# Patient Record
Sex: MALE | Race: BLACK OR AFRICAN AMERICAN | NOT HISPANIC OR LATINO | Employment: FULL TIME | URBAN - METROPOLITAN AREA
[De-identification: names, ages, dates, MRNs, and addresses within clinical notes are randomized per-mention and may not be internally consistent; named-entity substitution may affect disease eponyms.]

---

## 2023-04-04 ENCOUNTER — HOSPITAL ENCOUNTER (EMERGENCY)
Facility: HOSPITAL | Age: 38
Discharge: HOME | End: 2023-04-04
Attending: INTERNAL MEDICINE
Payer: COMMERCIAL

## 2023-04-04 VITALS
DIASTOLIC BLOOD PRESSURE: 88 MMHG | TEMPERATURE: 98 F | HEIGHT: 72 IN | OXYGEN SATURATION: 99 % | SYSTOLIC BLOOD PRESSURE: 145 MMHG | RESPIRATION RATE: 18 BRPM | HEART RATE: 89 BPM | BODY MASS INDEX: 24.38 KG/M2 | WEIGHT: 180 LBS

## 2023-04-04 DIAGNOSIS — S60.041A CONTUSION OF RIGHT RING FINGER WITHOUT DAMAGE TO NAIL, INITIAL ENCOUNTER: Primary | ICD-10-CM

## 2023-04-04 PROCEDURE — 99283 EMERGENCY DEPT VISIT LOW MDM: CPT

## 2023-04-05 NOTE — ED PROVIDER NOTES
Encounter Date: 4/4/2023       History     Chief Complaint   Patient presents with    Hand Pain     RIGHT 4TH FINGER SMASHED ON A FRAME AT WORK; INCREASED PAIN WITH MOVEMENT AND SWELLING NOTED;      37-year-old male presents to ED with right 4th digit finger secondary to get any caught between a drain today while at work.  States it is painful and swollen.  Denies any laceration or wounds.  Tried applying ice with minimal relief.  No medications taken at home.      The history is provided by the patient. No  was used.   Review of patient's allergies indicates:  No Known Allergies  History reviewed. No pertinent past medical history.  No past surgical history on file.  No family history on file.  Social History     Tobacco Use    Smoking status: Every Day     Types: Cigarettes    Smokeless tobacco: Never   Substance Use Topics    Alcohol use: Yes    Drug use: Never     Review of Systems   Constitutional:  Negative for fever.   HENT:  Negative for sore throat.    Respiratory:  Negative for shortness of breath.    Cardiovascular:  Negative for chest pain.   Gastrointestinal:  Negative for nausea.   Genitourinary:  Negative for dysuria.   Musculoskeletal:  Positive for arthralgias (finger pain). Negative for back pain.   Skin:  Negative for rash.   Neurological:  Negative for weakness.   Hematological:  Does not bruise/bleed easily.   All other systems reviewed and are negative.    Physical Exam     Initial Vitals [04/04/23 1929]   BP Pulse Resp Temp SpO2   (!) 145/88 89 18 97.8 °F (36.6 °C) 99 %      MAP       --         Physical Exam    Nursing note and vitals reviewed.  Constitutional: He appears well-developed and well-nourished.   HENT:   Head: Normocephalic and atraumatic.   Eyes: EOM are normal. Pupils are equal, round, and reactive to light.   Neck: Neck supple.   Normal range of motion.  Cardiovascular:  Normal rate, regular rhythm and intact distal pulses.           Pulmonary/Chest: Breath  sounds normal.   Musculoskeletal:         General: Normal range of motion.      Right hand: Swelling (minimal noted to distal tip of finger) and tenderness present. No deformity, lacerations or bony tenderness. Normal range of motion. Normal strength. Normal sensation. There is no disruption of two-point discrimination. Normal capillary refill. Normal pulse.      Left hand: Normal.        Hands:       Cervical back: Normal range of motion and neck supple.     Neurological: He is alert and oriented to person, place, and time. He has normal strength. GCS score is 15. GCS eye subscore is 4. GCS verbal subscore is 5. GCS motor subscore is 6.   Skin: Skin is warm and dry. Capillary refill takes less than 2 seconds.   Psychiatric: He has a normal mood and affect.       ED Course   Procedures  Labs Reviewed - No data to display       Imaging Results              X-Ray Finger 2 or More Views Right (Final result)  Result time 04/04/23 20:02:41      Final result by Abiola Shipley MD (04/04/23 20:02:41)                   Impression:      No acute osseous abnormality.      Electronically signed by: Abiola Shipley  Date:    04/04/2023  Time:    20:02               Narrative:    EXAMINATION:  XR FINGER 2 OR MORE VIEWS RIGHT    CLINICAL HISTORY:  pain;    TECHNIQUE:  3 views of the finger, RIGHT 4th digit    COMPARISON:  None    FINDINGS:  BONES/JOINTS: No fracture. No dislocation. Normal alignment.  There is some overlap of the digits on the lateral view which obscures evaluation.    SOFT TISSUES: Unremarkable                                       Medications - No data to display  Medical Decision Making:   Initial Assessment:   37-year-old male presents to ED with right 4th digit finger secondary to get any caught between a drain today while at work.  States it is painful and swollen.  Denies any laceration or wounds.  Tried applying ice with minimal relief.  No medications taken at home.  Differential Diagnosis:    Fracture, contusion, hematoma  Clinical Tests:   Radiological Study: Reviewed and Ordered                        Clinical Impression:   Final diagnoses:  [S60.041A] Contusion of right ring finger without damage to nail, initial encounter (Primary)        ED Disposition Condition    Discharge Stable          ED Prescriptions    None       Follow-up Information       Follow up With Specialties Details Why Contact Info    [facility] - Emergency Dept Emergency Medicine In 1 week If symptoms worsen [id number]  [facility] [phone] [phone]    Primary care provider  In 2 days As needed              Cornell Lackey PA-C  04/04/23 2043

## 2023-04-05 NOTE — DISCHARGE INSTRUCTIONS
Alternate Tylenol and ibuprofen every 4-6 hours as needed for symptom relief.  Apply ice to the affected area as well.

## 2023-05-22 ENCOUNTER — OFFICE VISIT (OUTPATIENT)
Dept: URGENT CARE | Facility: CLINIC | Age: 38
End: 2023-05-22

## 2023-05-22 VITALS
DIASTOLIC BLOOD PRESSURE: 83 MMHG | RESPIRATION RATE: 20 BRPM | TEMPERATURE: 99 F | HEIGHT: 72 IN | WEIGHT: 180 LBS | HEART RATE: 100 BPM | OXYGEN SATURATION: 100 % | BODY MASS INDEX: 24.38 KG/M2 | SYSTOLIC BLOOD PRESSURE: 129 MMHG

## 2023-05-22 DIAGNOSIS — Z11.3 SCREENING EXAMINATION FOR STD (SEXUALLY TRANSMITTED DISEASE): ICD-10-CM

## 2023-05-22 DIAGNOSIS — Z20.2 EXPOSURE TO SEXUALLY TRANSMITTED DISEASE (STD): Primary | ICD-10-CM

## 2023-05-22 PROBLEM — I10 HYPERTENSION: Status: ACTIVE | Noted: 2023-05-22

## 2023-05-22 LAB
C TRACH DNA SPEC QL NAA+PROBE: NOT DETECTED
N GONORRHOEA DNA SPEC QL NAA+PROBE: NOT DETECTED

## 2023-05-22 PROCEDURE — 87661 TRICHOMONAS VAGINALIS AMPLIF: CPT | Mod: 90 | Performed by: NURSE PRACTITIONER

## 2023-05-22 PROCEDURE — 99213 OFFICE O/P EST LOW 20 MIN: CPT | Mod: S$PBB,,, | Performed by: NURSE PRACTITIONER

## 2023-05-22 PROCEDURE — 87591 N.GONORRHOEAE DNA AMP PROB: CPT | Performed by: NURSE PRACTITIONER

## 2023-05-22 PROCEDURE — 99214 OFFICE O/P EST MOD 30 MIN: CPT | Mod: PBBFAC | Performed by: NURSE PRACTITIONER

## 2023-05-22 PROCEDURE — 99213 PR OFFICE/OUTPT VISIT, EST, LEVL III, 20-29 MIN: ICD-10-PCS | Mod: S$PBB,,, | Performed by: NURSE PRACTITIONER

## 2023-05-22 RX ORDER — METRONIDAZOLE 500 MG/1
2000 TABLET ORAL ONCE
Qty: 4 TABLET | Refills: 0 | Status: SHIPPED | OUTPATIENT
Start: 2023-05-22 | End: 2023-05-22

## 2023-05-22 NOTE — PROGRESS NOTES
Subjective:      Patient ID: Chandrakant Siddiqi is a 37 y.o. male.    Vitals:  height is 6' (1.829 m) and weight is 81.6 kg (180 lb). His oral temperature is 98.7 °F (37.1 °C). His blood pressure is 129/83 and his pulse is 100. His respiration is 20 and oxygen saturation is 100%.     Chief Complaint: Exposure to STD (STD testing)    Exposure to STD  The patient's pertinent negatives include no penile discharge, penile pain, scrotal swelling or testicular pain. Pertinent negatives include no dysuria, fever, frequency or urgency.  Presents with reports pf known exposure to Trichomonas by recent sexual partner. Pt reports unprotected sex 2 weeks ago with female. Pt denies any penile discharge or pain, and dysuria.     Constitution: Negative for fatigue and fever.   Genitourinary:  Negative for dysuria, frequency, urgency, painful ejaculation, penile discharge, painful ejaculation, penile pain, penile swelling, scrotal swelling and testicular pain.        Exposed to Trich by sexual partner, she is in the other room.    Objective:     Physical Exam   Constitutional: He is oriented to person, place, and time. He does not appear ill.   HENT:   Mouth/Throat: Mucous membranes are moist.   Eyes: Conjunctivae are normal.   Cardiovascular: Normal rate.   Pulmonary/Chest: Effort normal.   Abdominal: Normal appearance.   Genitourinary:         Comments: Deferred, pt denies     Neurological: He is alert and oriented to person, place, and time.   Skin: Skin is warm, dry and not diaphoretic.   Psychiatric: His behavior is normal. Mood, judgment and thought content normal.   Nursing note and vitals reviewed.      Assessment:       1. Exposure to sexually transmitted disease (STD)    2. Screening examination for STD (sexually transmitted disease)        Plan:   All sex partners (female and/or male) of patients treated for trichomoniasis are treated concurrently with the goal of preventing reinfection of the index patient and/or spread to  other sex contacts.  - abstain from sex until all results are known  - urine tests take up to 7 days to result  - vaginal swab tests are a same day result    - this clinic will ONLY call you for POSITIVE = ABNORMAL results.   We will not call you to tell you tests are NEGATIVE = NORMAL.    - if you need medication to treat any conditions, it was either prescribed to you today and sent to your pharmacy, or it will be sent when providers view abnormal results.  - if any of your tests are abnormal, we will call you with a plan of care.  - always require condoms  - partners will need treatment if  any +STDs on your testing. They have to have their own visit, we do not automatically treat them.     - If your testing were to come back + gonorrhea and/or chlamydia infections - You will be notified and you will need to be treated for those with the antibiotic injection and the oral antibiotics sent to your pharmacy.        Exposure to sexually transmitted disease (STD)  -     T.vaginalisisc, Amplified RNA  -     metroNIDAZOLE (FLAGYL) 500 MG tablet; Take 4 tablets (2,000 mg total) by mouth once. for 1 dose  Dispense: 4 tablet; Refill: 0    Screening examination for STD (sexually transmitted disease)  -     Chlamydia/GC, PCR  -     T.vaginalisisc, Amplified RNA

## 2023-05-24 LAB
SPECIMEN SOURCE: NORMAL
T VAGINALIS RRNA SPEC QL NAA+PROBE: NEGATIVE

## 2023-08-06 ENCOUNTER — HOSPITAL ENCOUNTER (EMERGENCY)
Facility: HOSPITAL | Age: 38
Discharge: HOME | End: 2023-08-06
Attending: EMERGENCY MEDICINE

## 2023-08-06 VITALS
RESPIRATION RATE: 19 BRPM | HEIGHT: 72 IN | BODY MASS INDEX: 23.03 KG/M2 | TEMPERATURE: 98 F | OXYGEN SATURATION: 99 % | HEART RATE: 77 BPM | DIASTOLIC BLOOD PRESSURE: 78 MMHG | WEIGHT: 170 LBS | SYSTOLIC BLOOD PRESSURE: 135 MMHG

## 2023-08-06 DIAGNOSIS — M54.12 CERVICAL RADICULOPATHY: Primary | ICD-10-CM

## 2023-08-06 PROCEDURE — 99283 EMERGENCY DEPT VISIT LOW MDM: CPT

## 2023-08-06 RX ORDER — GABAPENTIN 100 MG/1
100 CAPSULE ORAL 3 TIMES DAILY
Qty: 30 CAPSULE | Refills: 0 | Status: SHIPPED | OUTPATIENT
Start: 2023-08-06 | End: 2023-10-19 | Stop reason: SDUPTHER

## 2023-08-06 NOTE — ED TRIAGE NOTES
Intermit numbness to LUE x 2weeks. Denies injuries, +full ROM. Denies facial droops, speech changes, visual changes
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
## 2023-10-19 ENCOUNTER — OFFICE VISIT (OUTPATIENT)
Dept: FAMILY MEDICINE | Facility: CLINIC | Age: 38
End: 2023-10-19

## 2023-10-19 VITALS
WEIGHT: 178 LBS | HEIGHT: 72 IN | TEMPERATURE: 98 F | SYSTOLIC BLOOD PRESSURE: 126 MMHG | OXYGEN SATURATION: 100 % | DIASTOLIC BLOOD PRESSURE: 77 MMHG | BODY MASS INDEX: 24.11 KG/M2 | HEART RATE: 65 BPM | RESPIRATION RATE: 18 BRPM

## 2023-10-19 DIAGNOSIS — M54.12 CERVICAL RADICULOPATHY: Primary | ICD-10-CM

## 2023-10-19 DIAGNOSIS — Z00.00 WELL ADULT EXAM: ICD-10-CM

## 2023-10-19 DIAGNOSIS — M54.2 CHRONIC NECK PAIN: ICD-10-CM

## 2023-10-19 DIAGNOSIS — G89.29 CHRONIC NECK PAIN: ICD-10-CM

## 2023-10-19 PROCEDURE — 99215 OFFICE O/P EST HI 40 MIN: CPT | Mod: PBBFAC | Performed by: FAMILY MEDICINE

## 2023-10-19 PROCEDURE — 99204 OFFICE O/P NEW MOD 45 MIN: CPT | Mod: S$PBB,,, | Performed by: FAMILY MEDICINE

## 2023-10-19 PROCEDURE — 99204 PR OFFICE/OUTPT VISIT, NEW, LEVL IV, 45-59 MIN: ICD-10-PCS | Mod: S$PBB,,, | Performed by: FAMILY MEDICINE

## 2023-10-19 RX ORDER — CYCLOBENZAPRINE HCL 5 MG
5 TABLET ORAL 3 TIMES DAILY PRN
Qty: 60 TABLET | Refills: 0 | Status: SHIPPED | OUTPATIENT
Start: 2023-10-19

## 2023-10-19 RX ORDER — DICLOFENAC SODIUM 75 MG/1
75 TABLET, DELAYED RELEASE ORAL 2 TIMES DAILY
Qty: 60 TABLET | Refills: 0 | Status: SHIPPED | OUTPATIENT
Start: 2023-10-19

## 2023-10-19 RX ORDER — GABAPENTIN 100 MG/1
200 CAPSULE ORAL NIGHTLY
Qty: 60 CAPSULE | Refills: 1 | Status: SHIPPED | OUTPATIENT
Start: 2023-10-19
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
## 2023-10-30 PROBLEM — M54.2 CHRONIC NECK PAIN: Status: ACTIVE | Noted: 2023-10-30

## 2023-10-30 PROBLEM — M54.12 CERVICAL RADICULOPATHY: Status: ACTIVE | Noted: 2023-10-30

## 2023-10-30 PROBLEM — G89.29 CHRONIC NECK PAIN: Status: ACTIVE | Noted: 2023-10-30

## 2024-12-21 ENCOUNTER — HOSPITAL ENCOUNTER (EMERGENCY)
Facility: HOSPITAL | Age: 39
Discharge: HOME | End: 2024-12-21
Attending: STUDENT IN AN ORGANIZED HEALTH CARE EDUCATION/TRAINING PROGRAM

## 2024-12-21 VITALS
BODY MASS INDEX: 24.38 KG/M2 | OXYGEN SATURATION: 99 % | HEART RATE: 66 BPM | SYSTOLIC BLOOD PRESSURE: 143 MMHG | DIASTOLIC BLOOD PRESSURE: 81 MMHG | RESPIRATION RATE: 20 BRPM | WEIGHT: 180 LBS | TEMPERATURE: 98 F | HEIGHT: 72 IN

## 2024-12-21 DIAGNOSIS — S61.012A LACERATION OF LEFT THUMB, FOREIGN BODY PRESENCE UNSPECIFIED, NAIL DAMAGE STATUS UNSPECIFIED, INITIAL ENCOUNTER: Primary | ICD-10-CM

## 2024-12-21 PROCEDURE — 96372 THER/PROPH/DIAG INJ SC/IM: CPT | Performed by: PHYSICIAN ASSISTANT

## 2024-12-21 PROCEDURE — 63600175 PHARM REV CODE 636 W HCPCS: Performed by: PHYSICIAN ASSISTANT

## 2024-12-21 PROCEDURE — 25000003 PHARM REV CODE 250: Performed by: PHYSICIAN ASSISTANT

## 2024-12-21 PROCEDURE — 12041 INTMD RPR N-HF/GENIT 2.5CM/<: CPT

## 2024-12-21 PROCEDURE — 99284 EMERGENCY DEPT VISIT MOD MDM: CPT | Mod: 25

## 2024-12-21 RX ORDER — HYDROCODONE BITARTRATE AND ACETAMINOPHEN 10; 325 MG/1; MG/1
1 TABLET ORAL
Status: COMPLETED | OUTPATIENT
Start: 2024-12-21 | End: 2024-12-21

## 2024-12-21 RX ORDER — CEPHALEXIN 500 MG/1
500 CAPSULE ORAL 4 TIMES DAILY
Qty: 20 CAPSULE | Refills: 0 | Status: SHIPPED | OUTPATIENT
Start: 2024-12-21 | End: 2024-12-26

## 2024-12-21 RX ORDER — LIDOCAINE HYDROCHLORIDE 10 MG/ML
10 INJECTION, SOLUTION INFILTRATION; PERINEURAL
Status: COMPLETED | OUTPATIENT
Start: 2024-12-21 | End: 2024-12-21

## 2024-12-21 RX ORDER — IBUPROFEN 800 MG/1
800 TABLET ORAL 3 TIMES DAILY
Qty: 30 TABLET | Refills: 0 | Status: SHIPPED | OUTPATIENT
Start: 2024-12-21

## 2024-12-21 RX ORDER — TRAMADOL HYDROCHLORIDE 50 MG/1
50 TABLET ORAL EVERY 6 HOURS
Qty: 12 TABLET | Refills: 0 | Status: SHIPPED | OUTPATIENT
Start: 2024-12-21

## 2024-12-21 RX ORDER — IBUPROFEN 400 MG/1
800 TABLET ORAL
Status: COMPLETED | OUTPATIENT
Start: 2024-12-21 | End: 2024-12-21

## 2024-12-21 RX ORDER — CEFTRIAXONE 1 G/1
1 INJECTION, POWDER, FOR SOLUTION INTRAMUSCULAR; INTRAVENOUS
Status: COMPLETED | OUTPATIENT
Start: 2024-12-21 | End: 2024-12-21

## 2024-12-21 RX ADMIN — LIDOCAINE HYDROCHLORIDE 10 ML: 10 INJECTION, SOLUTION INFILTRATION; PERINEURAL at 04:12

## 2024-12-21 RX ADMIN — HYDROCODONE BITARTRATE AND ACETAMINOPHEN 1 TABLET: 10; 325 TABLET ORAL at 04:12

## 2024-12-21 RX ADMIN — IBUPROFEN 800 MG: 400 TABLET, FILM COATED ORAL at 04:12

## 2024-12-21 RX ADMIN — CEFTRIAXONE SODIUM 1 G: 1 INJECTION, POWDER, FOR SOLUTION INTRAMUSCULAR; INTRAVENOUS at 05:12

## 2024-12-21 NOTE — DISCHARGE INSTRUCTIONS
LEAVE on the dressing we applied for 24-48hours. Remove. Wash with DIAL soap and warm running water. DO NOT SOAK the area at all. Do not bathe- SHOWER only (basically do not soak the wound). If you notice any OOZING, REDNESS, or RUN FEVER >100.4F RETURN TO CLINIC. CHANGE DRESSING DAILY. DO NOT USE: peroxide, alcohol, or anything other than dial soap and water.    If the area gets dirty, clean it, dry it, wrap it. Leave it opened to air when watching tv.      RETURN FOR SUTURE REMOVAL IN 7-10DAYS

## 2024-12-21 NOTE — ED PROVIDER NOTES
Encounter Date: 12/21/2024       History     Chief Complaint   Patient presents with    Laceration     Pt to er with laceration to left thumb while using a knife yesterday at 1200.     See MDM for details.      The history is provided by the patient. No  was used.     Review of patient's allergies indicates:  No Known Allergies  History reviewed. No pertinent past medical history.  History reviewed. No pertinent surgical history.  Family History   Problem Relation Name Age of Onset    Hypertension Mother      Hypertension Father      Hypertension Maternal Grandmother      Breast cancer Maternal Grandmother      Hypertension Paternal Grandmother       Social History     Tobacco Use    Smoking status: Every Day     Current packs/day: 1.00     Types: Cigarettes    Smokeless tobacco: Never   Substance Use Topics    Alcohol use: Not Currently    Drug use: Never     Review of Systems   Constitutional: Negative.  Negative for activity change, appetite change, diaphoresis, fatigue and fever.   HENT:  Negative for rhinorrhea and sinus pressure.    Eyes: Negative.    Respiratory: Negative.  Negative for chest tightness.    Cardiovascular:  Negative for chest pain.   Gastrointestinal: Negative.  Negative for abdominal distention and abdominal pain.   Endocrine: Negative.    Genitourinary: Negative.    Musculoskeletal: Negative.  Negative for arthralgias.   Skin:  Positive for wound.   Allergic/Immunologic: Negative.    Neurological:  Negative for dizziness and headaches.   Hematological: Negative.    Psychiatric/Behavioral: Negative.         Physical Exam     Initial Vitals [12/21/24 1610]   BP Pulse Resp Temp SpO2   (!) 143/81 61 20 97.7 °F (36.5 °C) 98 %      MAP       --         Physical Exam    Nursing note and vitals reviewed.  Constitutional: He appears well-developed and well-nourished. He is cooperative. No distress.   HENT:   Head: Normocephalic and atraumatic. Not macrocephalic.   Right Ear:  Tympanic membrane normal. Tympanic membrane is not erythematous.   Left Ear: Tympanic membrane normal. Tympanic membrane is not erythematous.   Nose: No mucosal edema. Right sinus exhibits no frontal sinus tenderness. Left sinus exhibits no frontal sinus tenderness. Mouth/Throat: Mucous membranes are normal. No oropharyngeal exudate.   Eyes: Conjunctivae and EOM are normal. Pupils are equal, round, and reactive to light.   Neck: Neck supple. No tracheal deviation present.   Normal range of motion.  Cardiovascular:  Normal rate and regular rhythm.           Pulmonary/Chest: Effort normal. No respiratory distress. He has no decreased breath sounds.   Musculoskeletal:         General: Normal range of motion.      Cervical back: Normal range of motion and neck supple.     Lymphadenopathy:        Head (right side): No submental adenopathy present.        Head (left side): No submental adenopathy present.     He has no cervical adenopathy.   Neurological: He is alert and oriented to person, place, and time. He has normal strength. No cranial nerve deficit. GCS score is 15. GCS eye subscore is 4. GCS verbal subscore is 5. GCS motor subscore is 6.   Skin: Skin is warm.   Laceration left thumb. Granulation noted. See chart.    Psychiatric: He has a normal mood and affect. His behavior is normal. Judgment and thought content normal.         ED Course   Lac Repair    Date/Time: 12/21/2024 4:45 PM    Performed by: Rhonda Marquez PA  Authorized by: Rhonda Marquez PA    Consent:     Consent obtained:  Verbal    Consent given by:  Patient and spouse    Risks, benefits, and alternatives were discussed: yes      Risks discussed:  Infection, need for additional repair, nerve damage, poor wound healing, poor cosmetic result, pain, retained foreign body, tendon damage and vascular damage    Alternatives discussed:  No treatment, delayed treatment, referral and observation  Universal protocol:     Procedure explained and  questions answered to patient or proxy's satisfaction: yes      Patient identity confirmed:  Verbally with patient  Anesthesia:     Anesthesia method:  Nerve block    Block location:  Left thumb    Block needle gauge:  25 G    Block anesthetic:  Lidocaine 1% w/o epi    Block technique:  Digital block    Block injection procedure:  Anatomic landmarks identified, anatomic landmarks palpated, negative aspiration for blood, introduced needle and incremental injection    Block outcome:  Anesthesia achieved  Laceration details:     Location:  Finger    Finger location:  L thumb    Length (cm):  2.5    Depth (mm):  3  Pre-procedure details:     Preparation:  Patient was prepped and draped in usual sterile fashion  Exploration:     Limited defect created (wound extended): no      Imaging obtained: x-ray      Imaging outcome: foreign body not noted      Wound exploration: wound explored through full range of motion      Wound extent: areolar tissue violated      Wound extent: no fascia violation noted, no foreign bodies/material noted and no muscle damage noted      Contaminated: yes    Treatment:     Area cleansed with:  Povidone-iodine and saline    Amount of cleaning:  Extensive    Irrigation solution:  Sterile saline    Irrigation volume:  1000cc    Irrigation method:  Pressure wash    Visualized foreign bodies/material removed: no      Debridement:  Moderate    Undermining:  None    Scar revision: no    Skin repair:     Repair method:  Sutures    Suture size:  4-0    Suture material:  Nylon    Suture technique:  Simple interrupted    Number of sutures:  3  Approximation:     Approximation:  Close  Repair type:     Repair type:  Simple  Post-procedure details:     Dressing:  Non-adherent dressing    Procedure completion:  Tolerated well, no immediate complications  Comments:      At significant risk for     Labs Reviewed - No data to display       Imaging Results              X-Ray Finger 2 or More Views Left (Final  result)  Result time 12/21/24 16:49:34   Procedure changed from X-Ray Finger 2 or More Views Right     Final result by Gelacio España MD (12/21/24 16:49:34)                   Impression:      No acute osseous abnormality identified.      Electronically signed by: Gelacio España  Date:    12/21/2024  Time:    16:49               Narrative:    EXAMINATION:  XR FINGER 2 OR MORE VIEWS LEFT    CLINICAL HISTORY:  pain;left thumb;    TECHNIQUE:  Three-view    COMPARISON:  None available    FINDINGS:  Articular surface alignment is preserved.  There is no intrinsic osseous abnormality.  No acute fracture or dislocation identified.                                       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 Patient cleaned at that time. No other injuries noted. Patient is left hand dominant. Patient is up to date on tetanus vaccination.     Problems Addressed:  Laceration of left thumb, foreign body presence unspecified, nail damage status unspecified, initial encounter: acute illness or injury     Details: Differential diagnosis included but not limited to:  Laceration, infected wound, cellulitis     Laceration noted. Because it has been >24hours, 1L irrigation used to cleanse wound with iodine and chlorhexidine. Tried to limit the wound created and scarring by closely approximating wound. Referral made to orthopedic physician as well. I discussed with patient and spouse the importance of cleansing wound properly and taking antibiotics as prescribed to limit infection. Patient verbalizes understanding  that he is at increased risk of severe infection because of delayed time to sutures. Patient understands that he is likely to have an infection despite significant cleaning and closing of the wound.Discussed with Dr. George who agrees with plan. All questions asked and answered at time of visit.     Amount and/or Complexity of Data Reviewed  Independent Historian: spouse  Radiology: ordered. Decision-making details documented in ED Course.    Risk  Prescription drug management.                                      Clinical Impression:  Final diagnoses:  [S61.012A] Laceration of left thumb, foreign body presence unspecified, nail damage status unspecified, initial encounter (Primary)          ED Disposition Condition    Discharge Stable          ED Prescriptions       Medication Sig Dispense Start Date End Date Auth. Provider    cephALEXin (KEFLEX) 500 MG capsule Take 1 capsule (500 mg total) by mouth 4 (four) times daily. for 5 days 20 capsule 12/21/2024 12/26/2024 Rhonda Marquez PA    ibuprofen (ADVIL,MOTRIN) 800 MG tablet Take 1 tablet (800 mg total) by mouth 3 (three) times daily. 30 tablet 12/21/2024 -- Rhonda Marquez PA    traMADoL (ULTRAM) 50 mg tablet Take 1 tablet (50 mg total) by mouth every 6 (six) hours. 12 tablet 12/21/2024 -- Rhonda Marquez PA          Follow-up Information       Follow up With Specialties Details Why Contact Info    Xiomara Hooks MD Family Medicine Schedule an appointment as soon as possible for a visit today  [address] [facility] [address] [phone]      [facility] - Emergency Dept Emergency Medicine  As needed, If symptoms worsen [address]  [facility] [address] [phone]    Jorge Gao MD Orthopedic Surgery Call   [address] [facility] [address]  [phone]          This note was typed partially using voice recognition software.  Please be reminded that not all  corrections/addendums to grammar may have been made prior to closing of this chart.       Rhonda Marquez PA  12/21/24 1814

## 2025-01-01 ENCOUNTER — HOSPITAL ENCOUNTER (EMERGENCY)
Facility: HOSPITAL | Age: 40
Discharge: HOME | End: 2025-01-01
Attending: STUDENT IN AN ORGANIZED HEALTH CARE EDUCATION/TRAINING PROGRAM

## 2025-01-01 VITALS
WEIGHT: 180 LBS | SYSTOLIC BLOOD PRESSURE: 141 MMHG | TEMPERATURE: 98 F | DIASTOLIC BLOOD PRESSURE: 88 MMHG | BODY MASS INDEX: 24.38 KG/M2 | HEIGHT: 72 IN | OXYGEN SATURATION: 98 % | HEART RATE: 73 BPM | RESPIRATION RATE: 18 BRPM

## 2025-01-01 DIAGNOSIS — Z48.02 VISIT FOR SUTURE REMOVAL: Primary | ICD-10-CM

## 2025-01-01 PROCEDURE — 99999 HC NO LEVEL OF SERVICE - ED ONLY: CPT

## 2025-01-02 NOTE — ED PROVIDER NOTES
Encounter Date: 1/1/2025       History     Chief Complaint   Patient presents with    Suture / Staple Removal     Suture removal left thumb     See MDM    The history is provided by the patient. No  was used.     Review of patient's allergies indicates:  No Known Allergies  No past medical history on file.  No past surgical history on file.  Family History   Problem Relation Name Age of Onset    Hypertension Mother      Hypertension Father      Hypertension Maternal Grandmother      Breast cancer Maternal Grandmother      Hypertension Paternal Grandmother       Social History     Tobacco Use    Smoking status: Every Day     Current packs/day: 1.00     Types: Cigarettes    Smokeless tobacco: Never   Substance Use Topics    Alcohol use: Not Currently    Drug use: Never     Review of Systems   Constitutional:         Suture removal   All other systems reviewed and are negative.      Physical Exam     Initial Vitals [01/01/25 1848]   BP Pulse Resp Temp SpO2   (!) 141/88 73 18 98.3 °F (36.8 °C) 98 %      MAP       --         Physical Exam    Nursing note and vitals reviewed.  Constitutional: He appears well-developed and well-nourished.   Cardiovascular:  Normal rate.           Pulmonary/Chest: No respiratory distress.   Musculoskeletal:      Comments: Left thumb 3 sutures intact. Healing well. No redness or drainage     Neurological: He is alert and oriented to person, place, and time.   Skin: Skin is warm and dry.   Psychiatric: He has a normal mood and affect.         ED Course   Suture Removal    Date/Time: 1/1/2025 7:05 PM  Location procedure was performed: [facility] EMERGENCY DEPARTMENT    Performed by: Daphne Loya FNP  Authorized by: Dequan George MD  Body area: upper extremity  Location details: left thumb  Sutures Removed: 3  Staples Removed: 0  Complications: No  Specimens: No  Implants: No  Patient tolerance: Patient tolerated the procedure well with no immediate  complications        Labs Reviewed - No data to display       Imaging Results    None          Medications - No data to display  Medical Decision Making  38 y/o male presents with left thumb suture removal. Has 3 sutures intact. Healing well. No complications.     See procedure note.       Additional MDM:   Differential Diagnosis:   Other: The following diagnoses were also considered and will be evaluated: suture removal, cellulitis and abscess.                                   Clinical Impression:  Final diagnoses:  [Z48.02] Visit for suture removal (Primary)          ED Disposition Condition    Discharge Stable          ED Prescriptions    None       Follow-up Information       Follow up With Specialties Details Why Contact Info    Xiomara Hooks MD Family Medicine Call in 1 week As needed [address] [facility] [address] [phone]               Daphne Loya, JAYESH  01/01/25 9364